# Patient Record
Sex: MALE | HISPANIC OR LATINO | URBAN - METROPOLITAN AREA
[De-identification: names, ages, dates, MRNs, and addresses within clinical notes are randomized per-mention and may not be internally consistent; named-entity substitution may affect disease eponyms.]

---

## 2019-07-22 ENCOUNTER — PATIENT OUTREACH (OUTPATIENT)
Dept: SURGERY | Facility: OTHER | Age: 49
End: 2019-07-22

## 2019-07-22 NOTE — PROGRESS NOTES
CT# 5588, RMSE    Ct called and after came to the [address] [place] [address] office to provide cm with mileage proof and a Cigna bill that should have been taken care of last month  Cm will follow up with the bill and will submit mileage for reimbursement

## 2019-07-23 ENCOUNTER — PATIENT OUTREACH (OUTPATIENT)
Dept: SURGERY | Facility: OTHER | Age: 49
End: 2019-07-23

## 2019-07-23 NOTE — PROGRESS NOTES
CT#1487, RMSE    Cm called SP and spoke with Oma Smith about ct's Cigna new bill  The bill is in the amount of $34 40, the bill reflects the charges paid by the ct of $6 24 of the years overpayment  Explanation: Cigna Rx $37 70 a month   Paid by [facility]   $37 13 leaving   The client responsible for   52 cents  a month, adding up to $6 24 in a  Year  Currently the ct received a higher bill of $34 40, Marce asked cm to fax the bill this way they can review it and find where the charges are coming from  Cm scanned in the bill, KARO's and SCP

## 2019-07-24 ENCOUNTER — PATIENT OUTREACH (OUTPATIENT)
Dept: SURGERY | Facility: OTHER | Age: 49
End: 2019-07-24

## 2019-07-24 NOTE — PROGRESS NOTES
CT# 0438MP from [facility] called cm to notify cm that the ct is not responsible for the payment of $34 48 from [facility]  She stated the Cigna sent the bill to the ct instead of to [facility]  Nancy Fung stated that the ct should disregard the bill

## 2019-07-25 ENCOUNTER — PATIENT OUTREACH (OUTPATIENT)
Dept: SURGERY | Facility: OTHER | Age: 49
End: 2019-07-25

## 2019-07-25 NOTE — PROGRESS NOTES
CT# 8844, RMSE    Cm worked on ct's Daphne and July mileage  Cm worked again on ct's June and July mileage reimbursement

## 2019-08-07 ENCOUNTER — PATIENT OUTREACH (OUTPATIENT)
Dept: SURGERY | Facility: OTHER | Age: 49
End: 2019-08-07

## 2019-08-07 NOTE — PROGRESS NOTES
CT# 2963, RMSE    Cm processed ct's July mileage reimbursement  Cm scanned in ct's travel log, mileage proof and gift cards

## 2019-08-26 ENCOUNTER — PATIENT OUTREACH (OUTPATIENT)
Dept: SURGERY | Facility: OTHER | Age: 49
End: 2019-08-26

## 2019-08-26 NOTE — PROGRESS NOTES
CT#7277 RMSE    Ct came to the [facility] office today to drop off a [facility] bill, some mileage proof and a Cigna form to be looked at  Cm will work on ct's bill and mileage

## 2019-09-04 ENCOUNTER — PATIENT OUTREACH (OUTPATIENT)
Dept: SURGERY | Facility: OTHER | Age: 49
End: 2019-09-04

## 2019-09-04 NOTE — PROGRESS NOTES
CT#1487 RMSE    Cm faxed Alek Ramirez from [facility] AT [facility]'s [facility] bill for DOS:7/16/19 for $24 50 and the Medicare Summary to request an authorization to cover the deductibles  Please see scanned media

## 2019-09-05 ENCOUNTER — PATIENT OUTREACH (OUTPATIENT)
Dept: SURGERY | Facility: OTHER | Age: 49
End: 2019-09-05

## 2019-09-12 ENCOUNTER — PATIENT OUTREACH (OUTPATIENT)
Dept: SURGERY | Facility: OTHER | Age: 49
End: 2019-09-12

## 2019-09-12 NOTE — PROGRESS NOTES
CT#1487 RMSE    Cm prepared ct's check requisition for [facility] bill on DOS 7/16/19 for $24 50  Cm submitted to Supervisor for further processing  Please see scanned media

## 2019-09-16 ENCOUNTER — PATIENT OUTREACH (OUTPATIENT)
Dept: SURGERY | Facility: OTHER | Age: 49
End: 2019-09-16

## 2019-09-16 NOTE — PROGRESS NOTES
CT# 7256 RMSE    Cm scanned and emailed Laura Doty ct's August mileage reimbursement packet  Cm mailed out ct's [employer] gift card  Please see scanned media

## 2019-09-17 ENCOUNTER — PATIENT OUTREACH (OUTPATIENT)
Dept: SURGERY | Facility: OTHER | Age: 49
End: 2019-09-17

## 2019-09-17 NOTE — PROGRESS NOTES
CT# 1107 RMSE    Ct came to the [facility] office to hand deliver the bill for the eye doctor  Please see scanned media

## 2019-09-18 ENCOUNTER — PATIENT OUTREACH (OUTPATIENT)
Dept: SURGERY | Facility: OTHER | Age: 49
End: 2019-09-18

## 2019-09-18 NOTE — PROGRESS NOTES
CT# 6964 RMSE    Cm called Cain to ask about the ct's claim type on his medicare summary being DME services  Cain looked into this and stated that as long as the ct was not admitted for the procedure we should be able to request an Authorization for assistance  The bill's DOS was on 8/2/19 and it was stated on the medicare summary as the start and end date for the service, juan alberto will prepare an auth request and submit to [employer] once the ct corroborates that this visit was effectively outpatient  Please see scanned media

## 2019-09-19 ENCOUNTER — PATIENT OUTREACH (OUTPATIENT)
Dept: SURGERY | Facility: OTHER | Age: 49
End: 2019-09-19

## 2019-09-20 NOTE — PROGRESS NOTES
CT# 5177 RMSE    CM receive ct's approved auth # [id number] to prepare ct's check requisition for the [facility] of DOS 8/2/19 for $80  Please see scanned media

## 2019-09-23 ENCOUNTER — PATIENT OUTREACH (OUTPATIENT)
Dept: SURGERY | Facility: OTHER | Age: 49
End: 2019-09-23

## 2019-09-23 NOTE — PROGRESS NOTES
CT#1487 RMSE    Cm worked on [employer] from [employer] for [employer] 8/2/19 for $80  Cm prepared and faxed the [email or web address]  Received the approved auth# [id number] and prepared a check requisition  Please see scanned media  Cm submitted check req to supervisor for processing  Supervisor asked cm to correct the check requisition since it was missing the ct#  Cm corrected the check req  rescanned and emailed and also scanned into media

## 2019-09-24 ENCOUNTER — PATIENT OUTREACH (OUTPATIENT)
Dept: SURGERY | Facility: OTHER | Age: 49
End: 2019-09-24

## 2019-09-24 NOTE — PROGRESS NOTES
CT# 3316 RMSE    Ajay received an email from supervisor requesting cm to contact the [address] since for the check requisition to be processed the clinic must complete and submit a [address] form for our records  Cm called the contact number for billing on the bill and left a voicemail requesting someone from the billing department to contact cm  Cm will email or fax the [address] request forms to [address] [place] [address] then submit it to supervisor for processing of the bill on DOS 8/2/19

## 2019-10-04 ENCOUNTER — PATIENT OUTREACH (OUTPATIENT)
Dept: SURGERY | Facility: OTHER | Age: 49
End: 2019-10-04

## 2019-10-04 NOTE — PROGRESS NOTES
CT# 5917 RMSE    Supervisor asked cm to request the [address] completed form from the [place] office since without it the check requisition would not be able to proceed  Cm called and spoke to Yunier Crandall who emailed the completed [address] to cm  Please see scanned media

## 2019-10-08 ENCOUNTER — PATIENT OUTREACH (OUTPATIENT)
Dept: SURGERY | Facility: OTHER | Age: 49
End: 2019-10-08

## 2019-10-08 NOTE — PROGRESS NOTES
CT# 7257 RMSE    Ct came in to the [facility] office today to hand in mileage proof  Cm asked ct about wife's job and about when was the last time wife got tested for HIV  Ct stated he does not get into those conversations with his wife  Cm notified ct that the next time they are together in the office cm will touch on the topic again  Cm will work on [employer]

## 2019-10-10 ENCOUNTER — PATIENT OUTREACH (OUTPATIENT)
Dept: SURGERY | Facility: OTHER | Age: 49
End: 2019-10-10

## 2019-10-10 NOTE — PROGRESS NOTES
CT#1487 RMSE    Ajay worked on ct's September mileage reimbursement  Cm will scan in to the media tab once the gift card is purchased

## 2019-10-11 ENCOUNTER — PATIENT OUTREACH (OUTPATIENT)
Dept: SURGERY | Facility: OTHER | Age: 49
End: 2019-10-11

## 2019-10-11 NOTE — PROGRESS NOTES
CT# 1487 RMSE    Ct called cm to stop by the office to sign SCP's for cm since Epic does not have the results area figured out and normally cm has all ct's sign the scp's that will be needed according to the ct's needs and KARO's to later on be used like for mileage reimbursement and SPBP that must be submitted at later dates  Cm needed a couple from the ct since the ct is not up for RCT until next month but has encountered needs that need SCP's to be scanned in to [facility]  Cm called ct about 20 minutes after the scheduled time to meet and ct stated that he was unable to make since he was going to get his flu shot administered at his doctors office   Cm and Ct agreed to meet on 10/15 at 10am

## 2019-10-15 ENCOUNTER — PATIENT OUTREACH (OUTPATIENT)
Dept: SURGERY | Facility: OTHER | Age: 49
End: 2019-10-15

## 2019-10-15 NOTE — PROGRESS NOTES
CT# 9987 RMSE    Ct called cm but cm was in training and was unable to answer  Cm returned the call and left a voicemail telling ct cm's availability

## 2019-10-17 ENCOUNTER — PATIENT OUTREACH (OUTPATIENT)
Dept: SURGERY | Facility: OTHER | Age: 49
End: 2019-10-17

## 2019-10-17 NOTE — PROGRESS NOTES
CT# 2739 RMSE     [facility]  Ct received $86 65 in [employer] gift cards for this month  Please see scanned media  Cm mailed our ct's RCT, RST reminder letter since the ct is due next month

## 2019-10-29 ENCOUNTER — PATIENT OUTREACH (OUTPATIENT)
Dept: SURGERY | Facility: OTHER | Age: 49
End: 2019-10-29

## 2019-10-29 NOTE — PROGRESS NOTES
CT#1484 RMSE    Ct came to the [facility] office with wife to complete RCT, RST  Ct states is in stable medical health  Ct had labs done last July and saw Dr Kayleigh Subramanian on July 16th  Ct will see his ID Dr Montse Montes De Oca on 11/19/19 and will get labs done a week before seeing his ID Dr Bettencourt Finders will request ct's most recent labs to update ct's chart  Ct is adherent to HIV care  Ct has no oral health concerns and went to see the dentist last March at [facility] in [facility], ct was referred to see a specialized dentist by his Dr Jj Reynolds treat the ct for hemophilia  He will see Dr Alexx Ramon at [facility] on November 7th  Ct also saw his optometrist in March and has no follow up appointments scheduled, however ct asked for assistance for his vision care since every time he sees the optometrist it runs him up to $400 per visit, ct offered to refer him to our vision voucher program and explained that they perform a basic exam and can provide him with a regular prescription not any specialized treatments  Ct stated he would request the voucher if needed in the future  Ct is abstaining from risky behaviors , ct and cm spoke to ct's wife since she has not been tested for HIV since 2017, cm offered to be tested but she refused stating that she will request it to her doctor once they do other lab work for cholesterol and diabetes  Ct wishes to continue to participate in the mileage reimbursement program and will provide his mileage proof as needed  Ct has no current addictions or mental health concerns  Ct has active medicare part A & B, Cigna and SPBP  Ct and Cm completed an SPBP application to be submitted before it expires on 1/2020  Ct owns his home and his car, has no domestic violence concerns and is independent in ADL  Ct receives SSD income and his wife is employed part time  Ct has a good support system, no dependents and no issues with language comprehension, legal or nutrition   Recertification and Reassessment is complete

## 2019-10-30 ENCOUNTER — PATIENT OUTREACH (OUTPATIENT)
Dept: SURGERY | Facility: OTHER | Age: 49
End: 2019-10-30

## 2019-11-07 ENCOUNTER — PATIENT OUTREACH (OUTPATIENT)
Dept: SURGERY | Facility: OTHER | Age: 49
End: 2019-11-07

## 2019-11-07 NOTE — PROGRESS NOTES
CT#9665 RMSE    Ct came in to the [facility] office to hand in mileage proof  Cm worked on ct's October mileage

## 2019-11-13 ENCOUNTER — PATIENT OUTREACH (OUTPATIENT)
Dept: SURGERY | Facility: OTHER | Age: 49
End: 2019-11-13

## 2019-11-13 NOTE — PROGRESS NOTES
CT#1707 RMSE      Ajay worked on ct's Oct  Mileage reimbursement, cm will scan and email mileage packet to Domingo Farrell, and receipts to other parties involved in the mileage process by the end of the month  Please see scanned media

## 2019-11-18 ENCOUNTER — PATIENT OUTREACH (OUTPATIENT)
Dept: SURGERY | Facility: OTHER | Age: 49
End: 2019-11-18

## 2019-11-22 ENCOUNTER — PATIENT OUTREACH (OUTPATIENT)
Dept: SURGERY | Facility: OTHER | Age: 49
End: 2019-11-22

## 2019-11-22 NOTE — PROGRESS NOTES
CT# 6882 RMSE    Ct came to the [address] [place] [address],[facility] office to hand in some mileage proof to be added to the November mileage travel log

## 2019-12-05 ENCOUNTER — PATIENT OUTREACH (OUTPATIENT)
Dept: SURGERY | Facility: OTHER | Age: 49
End: 2019-12-05

## 2019-12-05 NOTE — PROGRESS NOTES
CT# 4923  [facility]    Ct called cm to ask if cm had his 2017 SSD letter  Ct came to cm's office and met with cm to request the paperwork be faxed to [facility] for a service the ct is requesting  Cm faxed the paperwork to [facility] at [phone] as requested by ct  Please see scanned media

## 2019-12-09 ENCOUNTER — PATIENT OUTREACH (OUTPATIENT)
Dept: SURGERY | Facility: OTHER | Age: 49
End: 2019-12-09

## 2019-12-19 ENCOUNTER — PATIENT OUTREACH (OUTPATIENT)
Dept: SURGERY | Facility: OTHER | Age: 49
End: 2019-12-19

## 2019-12-20 NOTE — PROGRESS NOTES
CT# 4007 MP Alejandra came to the [facility] office to  his milea gift card and hand in new [facility]

## 2019-12-27 ENCOUNTER — PATIENT OUTREACH (OUTPATIENT)
Dept: SURGERY | Facility: OTHER | Age: 49
End: 2019-12-27

## 2019-12-27 NOTE — PROGRESS NOTES
CT#1487 RMSE    Ct called cm to schedule an appointment for the ct to drop off documents  Cm and ct agreed to meet on 12/30/19 at the [facility]

## 2019-12-30 ENCOUNTER — PATIENT OUTREACH (OUTPATIENT)
Dept: SURGERY | Facility: OTHER | Age: 49
End: 2019-12-30

## 2020-01-02 ENCOUNTER — PATIENT OUTREACH (OUTPATIENT)
Dept: SURGERY | Facility: OTHER | Age: 50
End: 2020-01-02

## 2020-01-02 NOTE — PROGRESS NOTES
CT# 1487 RMSE    Cm gathered all supporting documents and SPBP application to submit to [facility] to renew ct's benefits  Please see scanned media

## 2020-01-03 ENCOUNTER — PATIENT OUTREACH (OUTPATIENT)
Dept: SURGERY | Facility: OTHER | Age: 50
End: 2020-01-03

## 2020-01-03 NOTE — PROGRESS NOTES
CT#1487 RMSE    Ajay worked on ct's December mileage today, once ajay purchases the gift cards, ajay will make copies and send the mileage packet to pertinent staff and to [facility]"" [facility]  Ajay will also scan into Epic

## 2020-01-30 ENCOUNTER — PATIENT OUTREACH (OUTPATIENT)
Dept: SURGERY | Facility: OTHER | Age: 50
End: 2020-01-30

## 2020-01-30 NOTE — PROGRESS NOTES
CT# 6138 MP Moss prepared travel log and mileage proof to create ct's December mileage reimbursement packet  Cm scanned and emailed Kandy Merino ct's December mileage packet with gas card copies

## 2020-02-03 ENCOUNTER — PATIENT OUTREACH (OUTPATIENT)
Dept: SURGERY | Facility: OTHER | Age: 50
End: 2020-02-03

## 2020-02-03 NOTE — PROGRESS NOTES
CT# 1707 [facility]    Ct called cm to inquire on the mileage reimbursement gas cards  Cm informed the ct that we had some issues with the gas cards this month but that they had been mailed out already

## 2020-02-07 ENCOUNTER — PATIENT OUTREACH (OUTPATIENT)
Dept: SURGERY | Facility: OTHER | Age: 50
End: 2020-02-07

## 2020-02-07 NOTE — PROGRESS NOTES
CT# 6473 MP Love emailed cm to notify cm about some discrepancies in the ct's Dec mileage travel log  Cm looked over the travel log corrected and updated the credits and negatives for next mileage reimbursement and emailed them to Natty Adames to update her documents  Cm also emailed the updated travel log to REJI  Please see scanned media

## 2020-02-10 ENCOUNTER — PATIENT OUTREACH (OUTPATIENT)
Dept: SURGERY | Facility: OTHER | Age: 50
End: 2020-02-10

## 2020-02-10 NOTE — PROGRESS NOTES
CT# 6191 [facility]    Ct came in to the [facility] office to hand in January and February mileage proof  C[facility] work on [facility]  Ct had mileage from an ED visit, cm explained that those are not covered by the funding agency

## 2020-02-17 ENCOUNTER — PATIENT OUTREACH (OUTPATIENT)
Dept: SURGERY | Facility: OTHER | Age: 50
End: 2020-02-17

## 2020-02-17 NOTE — PROGRESS NOTES
CT# 1487 RMSE    Cm worked on ct's January mileage, cm will scan January packet once the gas gift card is purchased  After working on the mileage travel log cm realized the ct's mileage was not enough and has decided to add it with February's mileage and submit it with February's mileage instead

## 2020-02-19 ENCOUNTER — PATIENT OUTREACH (OUTPATIENT)
Dept: SURGERY | Facility: OTHER | Age: 50
End: 2020-02-19

## 2020-02-19 NOTE — PROGRESS NOTES
CT# 1488 [facility]    Ct called cm to ask about dental offices  Ct was unsure where he could go for dental care since he has been paying at [address] provided the ct information to [employer] and to [address] suggested ct to call and setup an appointment and then cm would request the authorization through [employer] since the ct only has Medicare  Cm will follow up with ct after the appointment is made with the ct's availability

## 2020-02-21 ENCOUNTER — PATIENT OUTREACH (OUTPATIENT)
Dept: SURGERY | Facility: OTHER | Age: 50
End: 2020-02-21

## 2020-02-21 NOTE — PROGRESS NOTES
CT# 7049 MP    Anthonys from [facility] called benson to confirm that the ct has an appointment scheduled for 3/5/2020 at their office for dental care and that he needed an approved auth to be seen since the ct only has Medicare Part A and B  BENSON took [employer] number and prepared an [email or web address] [id number] req and faxed it to Juju Garcia at [employer]  Juju Garcia approved the Dental Tx and faxed over the approved auth which in return benson faxed to [facility] to keep in the ct's file until his visit on 3/5/2020  Please see scanned media for auth specifics

## 2020-03-04 ENCOUNTER — PATIENT OUTREACH (OUTPATIENT)
Dept: SURGERY | Facility: OTHER | Age: 50
End: 2020-03-04

## 2020-03-04 NOTE — PROGRESS NOTES
CT#1687 RMSE    Ct called cm to report his dental appointment was scheduled for tomorrow at 10am with [facility] AT [facility], juan alberto has an approved auth# [id number] until 4/29/2020 for this appointment

## 2020-03-05 ENCOUNTER — PATIENT OUTREACH (OUTPATIENT)
Dept: SURGERY | Facility: OTHER | Age: 50
End: 2020-03-05

## 2020-03-05 NOTE — PROGRESS NOTES
CT#5438 [facility]    Ct called cm to confirm that he would be going to his dental appointment, cm told the ct everything was good to go since the approved auth has already been faxed to the dental office

## 2020-03-06 ENCOUNTER — PATIENT OUTREACH (OUTPATIENT)
Dept: SURGERY | Facility: OTHER | Age: 50
End: 2020-03-06

## 2020-03-06 NOTE — PROGRESS NOTES
CT# 6701 RMSE    Cm received ct's dental tx plan from [facility]  Please see scanned media

## 2020-03-11 ENCOUNTER — PATIENT OUTREACH (OUTPATIENT)
Dept: SURGERY | Facility: OTHER | Age: 50
End: 2020-03-11

## 2020-03-11 NOTE — PROGRESS NOTES
CT# 6888 [facility]    Ct called  to request to come in since he wanted to provide cm with a bill  Ct came into the [facility] office to provide cm with a bill and to complete the satisfaction survey  Cm will look into the ct's bill and ask [facility] for assistance with this bill  Please see scanned media  Ct also asked about dental tx and approved auth to cover tx  Cm will scan and email entire dental plan to [facility] for further assistance  Cm will also request an auth for Dental tx plan  Cm called Gian Murry and asked if [facility] processes the invoicing for the dental bills instead of Sujey Lucero stated that they deal with the invoicing themselves for dental not HOPE  Please see scanned media  The dentist at [facility] called cm to inquire on the approved Auth# [id number] since he could not understand the payment process  Cm explained the authorization and how it works since he was also concerned about the expiration date and not having enough time to work on the clients dental tx plan  Cm explained that it would be updated and that  would send the new updated one to his office once received  Please see updated auth in scanned media

## 2020-03-12 ENCOUNTER — PATIENT OUTREACH (OUTPATIENT)
Dept: SURGERY | Facility: OTHER | Age: 50
End: 2020-03-12

## 2020-03-12 NOTE — PROGRESS NOTES
CT# 1487 RMSE    Cm prepared and mailed out an RCT, RST reminder letter for the ct to call cm and schedule an appointment  Cm worked on ct's [facility] bill's auth and check req  Please see scanned media  Ct called cm to ask if he could be assisted with a copayment for a hearing aide/device  CM called Constance Hallman from [facility] AT [place] to inquire on what is needed from the ct and the process to assist the ct

## 2020-03-16 ENCOUNTER — PATIENT OUTREACH (OUTPATIENT)
Dept: SURGERY | Facility: OTHER | Age: 50
End: 2020-03-16

## 2020-03-16 NOTE — PROGRESS NOTES
CT# 1487 RMSE    Ajay worked on ct's February mileage to be submitted and scanned in once the gift cards have been purchased

## 2020-03-17 ENCOUNTER — PATIENT OUTREACH (OUTPATIENT)
Dept: SURGERY | Facility: OTHER | Age: 50
End: 2020-03-17

## 2020-03-17 NOTE — PROGRESS NOTES
CT# 8348 RMSE    Ct called cm to ask for heroshang aide assistance  Ct emailed the letter to cm  Cm will scan in to media once cm is able too  CM will email the letter to [employer] this way they are able to review this need

## 2020-03-18 ENCOUNTER — PATIENT OUTREACH (OUTPATIENT)
Dept: SURGERY | Facility: OTHER | Age: 50
End: 2020-03-18

## 2020-03-18 NOTE — PROGRESS NOTES
CT RMSE    Marc Wood from [facility] emailed cm asking a few questions about the hearing aide to see how can we assist the ct  To obtain the devices  Cm called ct and asked the questions Marc Wood had emailed  Ct stated that he needed both hearing aides since one only works with the other and the sound passes from one to another  Ct also stated that the dr that referred him and is offering to assist by submitting the application stated that the device is over $4k and they only require the copay of $250 but the copay must be submitted with the application in order to benefit from the discount  This was also explained to Marc Wood in the email sent by juan alberto

## 2020-03-20 ENCOUNTER — PATIENT OUTREACH (OUTPATIENT)
Dept: SURGERY | Facility: OTHER | Age: 50
End: 2020-03-20

## 2020-03-20 NOTE — PROGRESS NOTES
CT#1487 RMSE     Cm sent this to ct's to explain the Mileage reimbursement situation  Please see below:    Due to our pandemic of COVID-19 our offices have been closed as asked by Keara Ibrahim and we have not been able to continue our paperwork needed to process mileage reimbursement for the month of February  This is notification that your mileage reimbursement gas gift cards might arrive in April instead of March  The month of February and March might arrive at the same time if work can resume by the ending of March  Please be patient as we are doing the best we can to keep the work flow going  Thank you for your patience

## 2020-03-23 ENCOUNTER — PATIENT OUTREACH (OUTPATIENT)
Dept: SURGERY | Facility: OTHER | Age: 50
End: 2020-03-23

## 2020-03-23 NOTE — PROGRESS NOTES
CT# 4148 [facility]    Ct called cm to report that SPBP had rejected his prescription Symtuza a Antiretroviral Agent in Group 1 of the SPBP medicaitons list  Cm emailed SPBP with the information attached and is waiting on SPBP's call for answers since the medication is covered by SPBP

## 2020-03-25 ENCOUNTER — PATIENT OUTREACH (OUTPATIENT)
Dept: SURGERY | Facility: OTHER | Age: 50
End: 2020-03-25

## 2020-03-25 NOTE — PROGRESS NOTES
CT#7930 RMSE    Ct called SPBP to ask if the ct was assisted with the medications situation  [address] staff stated that they had received the email sent out by juan alberto stating the situation and sent out the approval to the pharmacy to correct the situation  Cm attempted to contact ct to ask if ct was able to  his meds but left a message for ct to call cm back

## 2020-03-27 ENCOUNTER — PATIENT OUTREACH (OUTPATIENT)
Dept: SURGERY | Facility: OTHER | Age: 50
End: 2020-03-27

## 2020-03-27 NOTE — PROGRESS NOTES
CT#8525 RMSE    Ct called cm to ask about his mileage and went over a few trips over the phone  Ct requested some envelopes to send cm his new mileage proof since the office has been closed  Cm placed a few envelopes in a bigger envelope and sent through  to be mailed to ct so this way he is able to send in his mileage without having to expose himself

## 2020-03-31 ENCOUNTER — PATIENT OUTREACH (OUTPATIENT)
Dept: SURGERY | Facility: OTHER | Age: 50
End: 2020-03-31

## 2020-03-31 NOTE — PROGRESS NOTES
CT#5307 [facility] prepared a letter with instructions in Kiswahili and mailed it to the client along with envelopes  The purpose of this is to avoid contact with the ct and avoid exposure to COVID-19 by having the ct do all via phone instead of coming to the office  Ct is to provide cm with all documents needed to complete RCT, RST and/or MAWD, SPBP via phone or email  Cm needs to have all required documents to complete all applications mentioned  CM will contact ct and further explain if ct calls cm with any questions  Cm will attempt to answer all questions beforehand in the letter sent out with envelopes

## 2020-04-03 ENCOUNTER — PATIENT OUTREACH (OUTPATIENT)
Dept: SURGERY | Facility: OTHER | Age: 50
End: 2020-04-03

## 2020-04-15 ENCOUNTER — PATIENT OUTREACH (OUTPATIENT)
Dept: SURGERY | Facility: OTHER | Age: 50
End: 2020-04-15

## 2020-04-16 ENCOUNTER — PATIENT OUTREACH (OUTPATIENT)
Dept: SURGERY | Facility: OTHER | Age: 50
End: 2020-04-16

## 2020-04-17 ENCOUNTER — PATIENT OUTREACH (OUTPATIENT)
Dept: SURGERY | Facility: OTHER | Age: 50
End: 2020-04-17

## 2020-04-20 ENCOUNTER — PATIENT OUTREACH (OUTPATIENT)
Dept: SURGERY | Facility: OTHER | Age: 50
End: 2020-04-20

## 2020-04-24 ENCOUNTER — PATIENT OUTREACH (OUTPATIENT)
Dept: SURGERY | Facility: OTHER | Age: 50
End: 2020-04-24

## 2020-04-29 ENCOUNTER — PATIENT OUTREACH (OUTPATIENT)
Dept: SURGERY | Facility: OTHER | Age: 50
End: 2020-04-29

## 2020-04-30 ENCOUNTER — PATIENT OUTREACH (OUTPATIENT)
Dept: SURGERY | Facility: OTHER | Age: 50
End: 2020-04-30

## 2020-05-01 ENCOUNTER — PATIENT OUTREACH (OUTPATIENT)
Dept: SURGERY | Facility: OTHER | Age: 50
End: 2020-05-01

## 2020-05-15 ENCOUNTER — PATIENT OUTREACH (OUTPATIENT)
Dept: SURGERY | Facility: OTHER | Age: 50
End: 2020-05-15

## 2020-05-20 ENCOUNTER — PATIENT OUTREACH (OUTPATIENT)
Dept: SURGERY | Facility: OTHER | Age: 50
End: 2020-05-20

## 2020-05-21 ENCOUNTER — PATIENT OUTREACH (OUTPATIENT)
Dept: SURGERY | Facility: OTHER | Age: 50
End: 2020-05-21

## 2020-05-22 ENCOUNTER — PATIENT OUTREACH (OUTPATIENT)
Dept: SURGERY | Facility: OTHER | Age: 50
End: 2020-05-22

## 2020-05-26 ENCOUNTER — PATIENT OUTREACH (OUTPATIENT)
Dept: SURGERY | Facility: OTHER | Age: 50
End: 2020-05-26

## 2020-05-27 ENCOUNTER — PATIENT OUTREACH (OUTPATIENT)
Dept: SURGERY | Facility: OTHER | Age: 50
End: 2020-05-27

## 2020-05-28 ENCOUNTER — PATIENT OUTREACH (OUTPATIENT)
Dept: SURGERY | Facility: OTHER | Age: 50
End: 2020-05-28

## 2020-06-11 ENCOUNTER — PATIENT OUTREACH (OUTPATIENT)
Dept: SURGERY | Facility: OTHER | Age: 50
End: 2020-06-11

## 2020-06-16 ENCOUNTER — PATIENT OUTREACH (OUTPATIENT)
Dept: SURGERY | Facility: OTHER | Age: 50
End: 2020-06-16

## 2020-06-22 ENCOUNTER — PATIENT OUTREACH (OUTPATIENT)
Dept: SURGERY | Facility: OTHER | Age: 50
End: 2020-06-22

## 2020-06-23 ENCOUNTER — PATIENT OUTREACH (OUTPATIENT)
Dept: SURGERY | Facility: OTHER | Age: 50
End: 2020-06-23

## 2020-06-30 ENCOUNTER — PATIENT OUTREACH (OUTPATIENT)
Dept: SURGERY | Facility: OTHER | Age: 50
End: 2020-06-30

## 2020-07-06 ENCOUNTER — PATIENT OUTREACH (OUTPATIENT)
Dept: SURGERY | Facility: OTHER | Age: 50
End: 2020-07-06

## 2020-07-06 NOTE — PROGRESS NOTES
CT# 6125 RMSE    Cm submitted ct's SPBP express form today to renew ct's benefits  Please see scanned media

## 2020-07-24 ENCOUNTER — PATIENT OUTREACH (OUTPATIENT)
Dept: SURGERY | Facility: OTHER | Age: 50
End: 2020-07-24

## 2020-07-24 NOTE — PROGRESS NOTES
CT# 0469 RMSE    Cm received ct's mail with a collections letter/bill for DOS 2/7/2020 at the ER  Cm called ct to inform ct that [employer] does not cover collections nor ER visits  Ct stated he was very upset since this bill was a mix up  Ct states that the PCP sent him to get some test and when he arrived at the ER they admitted him like a pt even though he let them know that he was referred by his PCP for a test only  Cm suggested the ct should call the billing number and ask for help from the [facility] since this bill was a mixup and ct was not aware he would be billed

## 2020-08-20 ENCOUNTER — PATIENT OUTREACH (OUTPATIENT)
Dept: SURGERY | Facility: OTHER | Age: 50
End: 2020-08-20

## 2020-09-01 ENCOUNTER — PATIENT OUTREACH (OUTPATIENT)
Dept: SURGERY | Facility: OTHER | Age: 50
End: 2020-09-01

## 2020-09-01 NOTE — PROGRESS NOTES
CT# 3839 RMSE    Ct and wife called cm to notify cm that they would be moving to [place] by the end of the month  Ct stated that his body cannot take the extreme cold temperatures and that they had already sold the house and were leaving soon  Ct had a lot of questions regarding his Dx and the resources  Cm explained that until he is sure where he will be temporarily cm would be unable to link him to care in [place] since that state works differently and has limited resources  Cm also explained that he would have to take enough medications to last him until he sees his new PCP or ID Dr  In [place]  Cm encouraged the ct to speak to Dr Cb Raya and explain the situation  Cm also suggested he leave an emergency contact here that can get his medications and mail them to him if he is still not linked and runs out of medications  Cm encouraged ct and wife to make a plan for three months since due to COVID-19 it might take that long to link him with services in [place]  Cm will see through the transition and will call [phone] in [place] to help ct transition and link with HIV care in [facility]

## 2020-09-10 ENCOUNTER — PATIENT OUTREACH (OUTPATIENT)
Dept: SURGERY | Facility: OTHER | Age: 50
End: 2020-09-10

## 2020-09-10 NOTE — PROGRESS NOTES
CT# 1487 MP Moss worked on ct's August mileage packet  Cm will scan in mileage packet once the gift cards have been purchased and mailed out to ct

## 2020-09-16 ENCOUNTER — PATIENT OUTREACH (OUTPATIENT)
Dept: SURGERY | Facility: OTHER | Age: 50
End: 2020-09-16

## 2020-09-16 NOTE — PROGRESS NOTES
CT#4215 RMSE    Ct called to inquire on the mileage reimbursement and to ask cm about the contact in [facility] since they are looking to relocate to [facility] by November  Cm provided them with Randa Barnes from [employer] and warned them that the services in [place] are very different from [place] and to please ask prior to moving  Cm also suggested they call Larisa Nolasco since he is based in [facility] and might have a clearer picture on how services work in [place]  Ct thanked cm and stated they would keep cm in the loop

## 2020-09-24 ENCOUNTER — PATIENT OUTREACH (OUTPATIENT)
Dept: SURGERY | Facility: OTHER | Age: 50
End: 2020-09-24

## 2020-09-24 NOTE — PROGRESS NOTES
CT# 7165 [facility]    BENSON worked on ct's August mileage  Cm mailed out gift cards to ct and sent out mileage packet to be further processed by our funding agency  Please see scanned media for August mileage packet

## 2020-10-02 ENCOUNTER — PATIENT OUTREACH (OUTPATIENT)
Dept: SURGERY | Facility: OTHER | Age: 50
End: 2020-10-02

## 2020-10-05 ENCOUNTER — PATIENT OUTREACH (OUTPATIENT)
Dept: SURGERY | Facility: OTHER | Age: 50
End: 2020-10-05

## 2020-11-17 ENCOUNTER — PATIENT OUTREACH (OUTPATIENT)
Dept: SURGERY | Facility: OTHER | Age: 50
End: 2020-11-17

## 2025-01-11 NOTE — PROGRESS NOTES
CT# 1485 RMSE    Ct came into the [facility] office today with his wife to  some paperwork the ct had requested cm fax out to obtain some services  Cm also went over the upcoming SPBP application and asked the wife to please submit her pay stubs by the end of this month to submit them with the [facility] application that is due bu 1/31/20  Cm will follow up with ct  PROVIDER:[TOKEN:[[id number]],FOLLOWUP:[4-6 Days]]